# Patient Record
Sex: FEMALE | Race: WHITE | NOT HISPANIC OR LATINO | Employment: UNEMPLOYED | URBAN - METROPOLITAN AREA
[De-identification: names, ages, dates, MRNs, and addresses within clinical notes are randomized per-mention and may not be internally consistent; named-entity substitution may affect disease eponyms.]

---

## 2020-12-04 ENCOUNTER — APPOINTMENT (OUTPATIENT)
Dept: CT IMAGING | Facility: HOSPITAL | Age: 30
End: 2020-12-04

## 2020-12-04 ENCOUNTER — APPOINTMENT (OUTPATIENT)
Dept: GENERAL RADIOLOGY | Facility: HOSPITAL | Age: 30
End: 2020-12-04

## 2020-12-04 ENCOUNTER — HOSPITAL ENCOUNTER (OUTPATIENT)
Facility: HOSPITAL | Age: 30
Setting detail: OBSERVATION
Discharge: HOME | End: 2020-12-05
Attending: INTERNAL MEDICINE | Admitting: INTERNAL MEDICINE

## 2020-12-04 DIAGNOSIS — R07.9 CHEST PAIN, UNSPECIFIED TYPE: ICD-10-CM

## 2020-12-04 DIAGNOSIS — R50.9 FEVER, UNSPECIFIED FEVER CAUSE: ICD-10-CM

## 2020-12-04 DIAGNOSIS — U07.1 COVID-19 VIRUS INFECTION: ICD-10-CM

## 2020-12-04 DIAGNOSIS — R94.31 ABNORMAL EKG: ICD-10-CM

## 2020-12-04 DIAGNOSIS — U07.1 COVID-19 VIRUS DETECTED: Primary | ICD-10-CM

## 2020-12-04 PROBLEM — N90.89 VULVAR IRRITATION: Status: ACTIVE | Noted: 2018-08-13

## 2020-12-04 PROBLEM — N92.6 IRREGULAR MENSTRUAL CYCLE: Status: ACTIVE | Noted: 2018-08-13

## 2020-12-04 LAB
ABO GROUP BLD: NORMAL
ALBUMIN SERPL-MCNC: 4.5 G/DL (ref 3.5–5.2)
ALBUMIN/GLOB SERPL: 1.4 G/DL
ALP SERPL-CCNC: 79 U/L (ref 39–117)
ALT SERPL W P-5'-P-CCNC: 37 U/L (ref 1–33)
ANION GAP SERPL CALCULATED.3IONS-SCNC: 13 MMOL/L (ref 5–15)
AST SERPL-CCNC: 24 U/L (ref 1–32)
B-HCG UR QL: NEGATIVE
BASOPHILS # BLD AUTO: 0 10*3/MM3 (ref 0–0.2)
BASOPHILS NFR BLD AUTO: 0.7 % (ref 0–1.5)
BILIRUB SERPL-MCNC: 0.8 MG/DL (ref 0–1.2)
BLD GP AB SCN SERPL QL: NEGATIVE
BUN SERPL-MCNC: 11 MG/DL (ref 6–20)
BUN/CREAT SERPL: 14.7 (ref 7–25)
CALCIUM SPEC-SCNC: 9.5 MG/DL (ref 8.6–10.5)
CHLORIDE SERPL-SCNC: 99 MMOL/L (ref 98–107)
CO2 SERPL-SCNC: 22 MMOL/L (ref 22–29)
CREAT SERPL-MCNC: 0.75 MG/DL (ref 0.57–1)
CRP SERPL-MCNC: 0.77 MG/DL (ref 0–0.5)
D DIMER PPP FEU-MCNC: 0.32 MG/L (FEU) (ref 0–0.59)
D-LACTATE SERPL-SCNC: 1.4 MMOL/L (ref 0.5–2)
DEPRECATED RDW RBC AUTO: 37.2 FL (ref 37–54)
EOSINOPHIL # BLD AUTO: 0 10*3/MM3 (ref 0–0.4)
EOSINOPHIL NFR BLD AUTO: 0.5 % (ref 0.3–6.2)
ERYTHROCYTE [DISTWIDTH] IN BLOOD BY AUTOMATED COUNT: 12.9 % (ref 12.3–15.4)
FERRITIN SERPL-MCNC: 81.28 NG/ML (ref 13–150)
GFR SERPL CREATININE-BSD FRML MDRD: 91 ML/MIN/1.73
GLOBULIN UR ELPH-MCNC: 3.3 GM/DL
GLUCOSE SERPL-MCNC: 103 MG/DL (ref 65–99)
HCT VFR BLD AUTO: 45.6 % (ref 34–46.6)
HGB BLD-MCNC: 15.5 G/DL (ref 12–15.9)
LDH SERPL-CCNC: 147 U/L (ref 135–214)
LYMPHOCYTES # BLD AUTO: 0.5 10*3/MM3 (ref 0.7–3.1)
LYMPHOCYTES NFR BLD AUTO: 8.2 % (ref 19.6–45.3)
MCH RBC QN AUTO: 27.7 PG (ref 26.6–33)
MCHC RBC AUTO-ENTMCNC: 34 G/DL (ref 31.5–35.7)
MCV RBC AUTO: 81.5 FL (ref 79–97)
MONOCYTES # BLD AUTO: 0.8 10*3/MM3 (ref 0.1–0.9)
MONOCYTES NFR BLD AUTO: 12.6 % (ref 5–12)
NEUTROPHILS NFR BLD AUTO: 5.1 10*3/MM3 (ref 1.7–7)
NEUTROPHILS NFR BLD AUTO: 78 % (ref 42.7–76)
NRBC BLD AUTO-RTO: 0.3 /100 WBC (ref 0–0.2)
NT-PROBNP SERPL-MCNC: 52.5 PG/ML (ref 0–450)
PLATELET # BLD AUTO: 337 10*3/MM3 (ref 140–450)
PMV BLD AUTO: 8.5 FL (ref 6–12)
POTASSIUM SERPL-SCNC: 3.5 MMOL/L (ref 3.5–5.2)
PROCALCITONIN SERPL-MCNC: 0.08 NG/ML (ref 0–0.25)
PROT SERPL-MCNC: 7.8 G/DL (ref 6–8.5)
RBC # BLD AUTO: 5.59 10*6/MM3 (ref 3.77–5.28)
RH BLD: POSITIVE
SODIUM SERPL-SCNC: 134 MMOL/L (ref 136–145)
T&S EXPIRATION DATE: NORMAL
TROPONIN T SERPL-MCNC: <0.01 NG/ML (ref 0–0.03)
TROPONIN T SERPL-MCNC: <0.01 NG/ML (ref 0–0.03)
WBC # BLD AUTO: 6.6 10*3/MM3 (ref 3.4–10.8)

## 2020-12-04 PROCEDURE — G0378 HOSPITAL OBSERVATION PER HR: HCPCS

## 2020-12-04 PROCEDURE — 0 IOPAMIDOL PER 1 ML: Performed by: PHYSICIAN ASSISTANT

## 2020-12-04 PROCEDURE — 25010000002 ONDANSETRON PER 1 MG: Performed by: PHYSICIAN ASSISTANT

## 2020-12-04 PROCEDURE — 86900 BLOOD TYPING SEROLOGIC ABO: CPT

## 2020-12-04 PROCEDURE — 83615 LACTATE (LD) (LDH) ENZYME: CPT | Performed by: PHYSICIAN ASSISTANT

## 2020-12-04 PROCEDURE — 85025 COMPLETE CBC W/AUTO DIFF WBC: CPT | Performed by: PHYSICIAN ASSISTANT

## 2020-12-04 PROCEDURE — 85379 FIBRIN DEGRADATION QUANT: CPT | Performed by: PHYSICIAN ASSISTANT

## 2020-12-04 PROCEDURE — 96374 THER/PROPH/DIAG INJ IV PUSH: CPT

## 2020-12-04 PROCEDURE — 71045 X-RAY EXAM CHEST 1 VIEW: CPT

## 2020-12-04 PROCEDURE — 84484 ASSAY OF TROPONIN QUANT: CPT | Performed by: PHYSICIAN ASSISTANT

## 2020-12-04 PROCEDURE — 86140 C-REACTIVE PROTEIN: CPT | Performed by: PHYSICIAN ASSISTANT

## 2020-12-04 PROCEDURE — 82728 ASSAY OF FERRITIN: CPT | Performed by: PHYSICIAN ASSISTANT

## 2020-12-04 PROCEDURE — 99284 EMERGENCY DEPT VISIT MOD MDM: CPT

## 2020-12-04 PROCEDURE — 86901 BLOOD TYPING SEROLOGIC RH(D): CPT

## 2020-12-04 PROCEDURE — 80053 COMPREHEN METABOLIC PANEL: CPT | Performed by: PHYSICIAN ASSISTANT

## 2020-12-04 PROCEDURE — 83605 ASSAY OF LACTIC ACID: CPT | Performed by: PHYSICIAN ASSISTANT

## 2020-12-04 PROCEDURE — 81025 URINE PREGNANCY TEST: CPT | Performed by: PHYSICIAN ASSISTANT

## 2020-12-04 PROCEDURE — 93005 ELECTROCARDIOGRAM TRACING: CPT | Performed by: PHYSICIAN ASSISTANT

## 2020-12-04 PROCEDURE — 84145 PROCALCITONIN (PCT): CPT | Performed by: PHYSICIAN ASSISTANT

## 2020-12-04 PROCEDURE — 87040 BLOOD CULTURE FOR BACTERIA: CPT | Performed by: PHYSICIAN ASSISTANT

## 2020-12-04 PROCEDURE — 86901 BLOOD TYPING SEROLOGIC RH(D): CPT | Performed by: PHYSICIAN ASSISTANT

## 2020-12-04 PROCEDURE — 86900 BLOOD TYPING SEROLOGIC ABO: CPT | Performed by: PHYSICIAN ASSISTANT

## 2020-12-04 PROCEDURE — 71275 CT ANGIOGRAPHY CHEST: CPT

## 2020-12-04 PROCEDURE — 83880 ASSAY OF NATRIURETIC PEPTIDE: CPT | Performed by: PHYSICIAN ASSISTANT

## 2020-12-04 PROCEDURE — 99219 PR INITIAL OBSERVATION CARE/DAY 50 MINUTES: CPT | Performed by: PHYSICIAN ASSISTANT

## 2020-12-04 PROCEDURE — 86850 RBC ANTIBODY SCREEN: CPT | Performed by: PHYSICIAN ASSISTANT

## 2020-12-04 RX ORDER — ACETAMINOPHEN 650 MG/1
650 SUPPOSITORY RECTAL EVERY 4 HOURS PRN
Status: DISCONTINUED | OUTPATIENT
Start: 2020-12-04 | End: 2020-12-05 | Stop reason: HOSPADM

## 2020-12-04 RX ORDER — SODIUM CHLORIDE 0.9 % (FLUSH) 0.9 %
10 SYRINGE (ML) INJECTION AS NEEDED
Status: DISCONTINUED | OUTPATIENT
Start: 2020-12-04 | End: 2020-12-05 | Stop reason: HOSPADM

## 2020-12-04 RX ORDER — ACETAMINOPHEN 160 MG/5ML
650 SOLUTION ORAL EVERY 4 HOURS PRN
Status: DISCONTINUED | OUTPATIENT
Start: 2020-12-04 | End: 2020-12-05 | Stop reason: HOSPADM

## 2020-12-04 RX ORDER — POTASSIUM CHLORIDE 20 MEQ/1
40 TABLET, EXTENDED RELEASE ORAL AS NEEDED
Status: DISCONTINUED | OUTPATIENT
Start: 2020-12-04 | End: 2020-12-05 | Stop reason: HOSPADM

## 2020-12-04 RX ORDER — NITROGLYCERIN 0.4 MG/1
0.4 TABLET SUBLINGUAL
Status: DISCONTINUED | OUTPATIENT
Start: 2020-12-04 | End: 2020-12-05 | Stop reason: HOSPADM

## 2020-12-04 RX ORDER — ONDANSETRON 2 MG/ML
4 INJECTION INTRAMUSCULAR; INTRAVENOUS ONCE
Status: COMPLETED | OUTPATIENT
Start: 2020-12-04 | End: 2020-12-04

## 2020-12-04 RX ORDER — ACETAMINOPHEN 325 MG/1
650 TABLET ORAL EVERY 4 HOURS PRN
Status: DISCONTINUED | OUTPATIENT
Start: 2020-12-04 | End: 2020-12-05 | Stop reason: HOSPADM

## 2020-12-04 RX ORDER — SODIUM CHLORIDE 0.9 % (FLUSH) 0.9 %
10 SYRINGE (ML) INJECTION EVERY 12 HOURS SCHEDULED
Status: DISCONTINUED | OUTPATIENT
Start: 2020-12-04 | End: 2020-12-05 | Stop reason: HOSPADM

## 2020-12-04 RX ORDER — MAGNESIUM SULFATE HEPTAHYDRATE 40 MG/ML
2 INJECTION, SOLUTION INTRAVENOUS AS NEEDED
Status: DISCONTINUED | OUTPATIENT
Start: 2020-12-04 | End: 2020-12-05 | Stop reason: HOSPADM

## 2020-12-04 RX ORDER — ONDANSETRON 2 MG/ML
4 INJECTION INTRAMUSCULAR; INTRAVENOUS EVERY 6 HOURS PRN
Status: DISCONTINUED | OUTPATIENT
Start: 2020-12-04 | End: 2020-12-05 | Stop reason: HOSPADM

## 2020-12-04 RX ORDER — CHOLECALCIFEROL (VITAMIN D3) 125 MCG
5 CAPSULE ORAL NIGHTLY PRN
Status: DISCONTINUED | OUTPATIENT
Start: 2020-12-04 | End: 2020-12-05 | Stop reason: HOSPADM

## 2020-12-04 RX ORDER — ONDANSETRON 4 MG/1
4 TABLET, FILM COATED ORAL EVERY 6 HOURS PRN
Status: DISCONTINUED | OUTPATIENT
Start: 2020-12-04 | End: 2020-12-05 | Stop reason: HOSPADM

## 2020-12-04 RX ORDER — ACETAMINOPHEN 500 MG
1000 TABLET ORAL ONCE
Status: COMPLETED | OUTPATIENT
Start: 2020-12-04 | End: 2020-12-04

## 2020-12-04 RX ORDER — MAGNESIUM SULFATE 1 G/100ML
1 INJECTION INTRAVENOUS AS NEEDED
Status: DISCONTINUED | OUTPATIENT
Start: 2020-12-04 | End: 2020-12-05 | Stop reason: HOSPADM

## 2020-12-04 RX ORDER — ALUMINA, MAGNESIA, AND SIMETHICONE 2400; 2400; 240 MG/30ML; MG/30ML; MG/30ML
15 SUSPENSION ORAL EVERY 6 HOURS PRN
Status: DISCONTINUED | OUTPATIENT
Start: 2020-12-04 | End: 2020-12-05 | Stop reason: HOSPADM

## 2020-12-04 RX ADMIN — ACETAMINOPHEN 650 MG: 325 TABLET, FILM COATED ORAL at 23:15

## 2020-12-04 RX ADMIN — ONDANSETRON 4 MG: 2 INJECTION, SOLUTION INTRAMUSCULAR; INTRAVENOUS at 17:07

## 2020-12-04 RX ADMIN — SODIUM CHLORIDE 1000 ML: 9 INJECTION, SOLUTION INTRAVENOUS at 17:04

## 2020-12-04 RX ADMIN — Medication 10 ML: at 23:38

## 2020-12-04 RX ADMIN — IOPAMIDOL 100 ML: 755 INJECTION, SOLUTION INTRAVENOUS at 19:28

## 2020-12-04 RX ADMIN — ACETAMINOPHEN 1000 MG: 500 TABLET, FILM COATED ORAL at 17:08

## 2020-12-04 NOTE — ED PROVIDER NOTES
Subjective   Patient is a 30-year-old female with no significant PMH who presents with notable complaints.  Patient states she was exposed to someone with COVID-19 5 days ago and started having symptoms days ago.  Patient reports her symptoms include a cough without hemoptysis, low-grade fever, body aches and chills, sore throat, nausea, right-sided chest pain, shortness of breath with exertion.  Patient states she has been taking Tylenol with minimal relief of her symptoms.  Patient states she did get a rapid Covid test yesterday which was positive.  Patient describes her chest pain as a constant sharp type pain without any radiation the pain into her arm or jaw it is worse with palpation she currently rates it as severe.  She reports nausea yesterday evening denies any currently.  Patient denies any vomiting diarrhea or abdominal pain.  Chest denies any lower extremity edema or heart palpitations.  She denies any headache lightheadedness or dizziness.  Denies any other alleviating or just pain factors.  Denies any trouble handling her oral secretions or voice change.          Review of Systems   Constitutional: Positive for chills, fatigue and fever. Negative for activity change, appetite change, diaphoresis and unexpected weight change.   HENT: Positive for rhinorrhea and sore throat. Negative for congestion, ear discharge, ear pain, nosebleeds, sinus pressure, sinus pain, sneezing, trouble swallowing and voice change.    Eyes: Negative for photophobia and visual disturbance.   Respiratory: Positive for cough and shortness of breath. Negative for apnea, choking, chest tightness, wheezing and stridor.    Cardiovascular: Positive for chest pain. Negative for palpitations and leg swelling.   Gastrointestinal: Negative for abdominal distention, abdominal pain, constipation, diarrhea, nausea and vomiting.   Genitourinary: Negative.    Musculoskeletal: Positive for back pain. Negative for neck pain and neck stiffness.    Skin: Negative.    Neurological: Negative.        No past medical history on file.    No Known Allergies    No past surgical history on file.    No family history on file.    Social History     Socioeconomic History   • Marital status:      Spouse name: Not on file   • Number of children: Not on file   • Years of education: Not on file   • Highest education level: Not on file           Objective   Physical Exam  Vitals signs and nursing note reviewed.   Constitutional:       General: She is not in acute distress.     Appearance: She is well-developed. She is obese. She is not ill-appearing, toxic-appearing or diaphoretic.   HENT:      Head: Normocephalic and atraumatic.      Right Ear: Tympanic membrane, ear canal and external ear normal.      Left Ear: Tympanic membrane, ear canal and external ear normal.      Nose: Nose normal.      Mouth/Throat:      Mouth: Mucous membranes are moist.      Pharynx: Oropharynx is clear. Uvula midline. No pharyngeal swelling, oropharyngeal exudate, posterior oropharyngeal erythema or uvula swelling.      Tonsils: No tonsillar exudate or tonsillar abscesses.   Eyes:      General: No scleral icterus.     Extraocular Movements: Extraocular movements intact.      Pupils: Pupils are equal, round, and reactive to light.   Cardiovascular:      Rate and Rhythm: Normal rate and regular rhythm.      Pulses: Normal pulses.      Heart sounds: Normal heart sounds. No murmur. No friction rub. No gallop.    Pulmonary:      Effort: Pulmonary effort is normal. No respiratory distress.      Breath sounds: Normal breath sounds. No stridor. No wheezing, rhonchi or rales.   Chest:      Chest wall: No tenderness.   Abdominal:      General: Bowel sounds are normal. There is no distension. There are no signs of injury.      Palpations: Abdomen is soft. There is no fluid wave, hepatomegaly, splenomegaly or mass.      Tenderness: There is no abdominal tenderness. There is no right CVA tenderness,  "left CVA tenderness, guarding or rebound.      Hernia: No hernia is present.   Musculoskeletal:      Right lower leg: No edema.      Left lower leg: No edema.   Skin:     General: Skin is warm.      Capillary Refill: Capillary refill takes less than 2 seconds.      Coloration: Skin is not cyanotic, jaundiced or pale.      Findings: No rash.   Neurological:      General: No focal deficit present.      Mental Status: She is alert and oriented to person, place, and time.      GCS: GCS eye subscore is 4. GCS verbal subscore is 5. GCS motor subscore is 6.   Psychiatric:         Mood and Affect: Mood normal.         Behavior: Behavior normal.         Procedures           ED Course  ED Course as of Dec 04 2033   Fri Dec 04, 2020   1755 Patient care transferred to Ban [facility]COSO pending lab results and nadeem    [AA]   1846 Delay in care related to patient being taken to CT.    [AL]   1925 Patient in CT at this time.  Awaiting results.    [AL]      ED Course User Index  [AA] Luana Page PA  [AL] Ban Martin, NP    /82 (BP Location: Right arm, Patient Position: Lying)   Pulse 100   Temp 99.8 °F (37.7 °C) (Oral)   Resp 16   Ht 170.2 cm (67\")   Wt 121 kg (265 lb 14 oz)   LMP  (LMP Unknown)   SpO2 99%   BMI 41.64 kg/m²   Medications   sodium chloride 0.9 % flush 10 mL (has no administration in time range)   sodium chloride 0.9 % bolus 1,000 mL (0 mL Intravenous Stopped 12/4/20 1734)   acetaminophen (TYLENOL) tablet 1,000 mg (1,000 mg Oral Given 12/4/20 1708)   ondansetron (ZOFRAN) injection 4 mg (4 mg Intravenous Given 12/4/20 1707)   iopamidol (ISOVUE-370) 76 % injection 100 mL (100 mL Intravenous Given 12/4/20 1928)     Labs Reviewed   COMPREHENSIVE METABOLIC PANEL - Abnormal; Notable for the following components:       Result Value    Glucose 103 (*)     Sodium 134 (*)     ALT (SGPT) 37 (*)     All other components within normal limits    Narrative:     GFR Normal >60  Chronic Kidney Disease <60  Kidney " Failure <15     CBC WITH AUTO DIFFERENTIAL - Abnormal; Notable for the following components:    RBC 5.59 (*)     Neutrophil % 78.0 (*)     Lymphocyte % 8.2 (*)     Monocyte % 12.6 (*)     Lymphocytes, Absolute 0.50 (*)     nRBC 0.3 (*)     All other components within normal limits   PREGNANCY, URINE - Normal   BNP (IN-HOUSE) - Normal    Narrative:     Among patients with dyspnea, NT-proBNP is highly sensitive for the detection of acute congestive heart failure. In addition NT-proBNP of <300 pg/ml effectively rules out acute congestive heart failure with 99% negative predictive value.    Results may be falsely decreased if patient taking Biotin.     TROPONIN (IN-HOUSE) - Normal    Narrative:     Troponin T Reference Range:  <= 0.03 ng/mL-   Negative for AMI  >0.03 ng/mL-     Abnormal for myocardial necrosis.  Clinicians would have to utilize clinical acumen, EKG, Troponin and serial changes to determine if it is an Acute Myocardial Infarction or myocardial injury due to an underlying chronic condition.       Results may be falsely decreased if patient taking Biotin.     BLOOD CULTURE   BLOOD CULTURE   CBC AND DIFFERENTIAL    Narrative:     The following orders were created for panel order CBC & Differential.  Procedure                               Abnormality         Status                     ---------                               -----------         ------                     CBC Auto Differential[[id number]]        Abnormal            Final result                 Please view results for these tests on the individual orders.     Ct Chest Pulmonary Embolism    Result Date: 12/4/2020  No evidence of pulmonary embolus. Mild bronchial wall thickening, acute versus chronic bronchitis.     Electronically Signed By-Ingrid Smith MD On:12/4/2020 7:44 PM This report was finalized on [id number] by  Ingrid Smith MD.    Xr Chest Ap    Result Date: 12/4/2020  No acute chest findings.  Electronically Signed By-Alanna Marlow  MD On:12/4/2020 4:36 PM This report was finalized on [id number] by  Alanna Marlow MD.                                         HEART Score (for prediction of 6-week risk of major adverse cardiac event) reviewed and/or performed as part of the patient evaluation and treatment planning process.  The result associated with this review/performance is: 1       MDM  Number of Diagnoses or Management Options  Abnormal EKG:   Chest pain, unspecified type:   COVID-19 virus detected:   Fever, unspecified fever cause:   Diagnosis management comments: Chart Review:  Comorbidity: None  Differentials: Covid pneumonia, bronchitis, PE, URI, viral illness, electrolyte abnormality      ;this list is not all inclusive and does not constitute the entirety of considered causes  ECG: Interpreted on myself and Dr. Chaudhry shows sinus tachycardia abnormal T waves considered ischemia in diffuse leads  Labs: As above  Imaging: Was interpreted by physician and reviewed by myself: Ct Chest Pulmonary Embolism    Result Date: 12/4/2020  No evidence of pulmonary embolus. Mild bronchial wall thickening, acute versus chronic bronchitis.     Electronically Signed By-Ingrid Smith MD On:12/4/2020 7:44 PM This report was finalized on [id number] by  Ingrid Smith MD.    Xr Chest Ap    Result Date: 12/4/2020  No acute chest findings.  Electronically Signed By-Alanna Marlow MD On:12/4/2020 4:36 PM This report was finalized on [id number] by  Alanna Marlow MD.    Disposition/Treatment:  Insert PPE while in the ED IV was placed and labs were obtained patient was given fluids and Tylenol she was febrile upon arrival to the ED. patient was placed on proper monitor.  Patient care was transferred to [facility] pending results and disposition.    1754-care of this patient was assumed from LIZZETTE Lopez pending CT results and likely admission for EKG changes.  Previous HPI, ROS, PE, and MDM performed per previous provider.  Upon reassessment, patient was  noted to be pink warm and dry no distress noted.  She is ambulatory to the bedside commode without difficulties.  Vital signs stable.  Hospitalist paged for admission.  Spoke with LIZZETTE Ignacio who accepted admission on behalf of Dr. Zuniga.        Amount and/or Complexity of Data Reviewed  Clinical lab tests: reviewed  Tests in the radiology section of CPT®: reviewed  Tests in the medicine section of CPT®: reviewed    Patient Progress  Patient progress: stable      Final diagnoses:   COVID-19 virus detected   Fever, unspecified fever cause   Chest pain, unspecified type   Abnormal EKG            Ban Martin, NP  12/04/20 2033

## 2020-12-04 NOTE — ED NOTES
covid + prior to encounter c/o fever, and generalized body aches. Also c/o nausea and diarrhea this am     Kylie Mancera RN  12/04/20 2074

## 2020-12-05 VITALS
HEIGHT: 67 IN | DIASTOLIC BLOOD PRESSURE: 80 MMHG | RESPIRATION RATE: 16 BRPM | WEIGHT: 265.65 LBS | SYSTOLIC BLOOD PRESSURE: 126 MMHG | HEART RATE: 80 BPM | TEMPERATURE: 97.8 F | BODY MASS INDEX: 41.7 KG/M2 | OXYGEN SATURATION: 98 %

## 2020-12-05 PROBLEM — E66.01 MORBID OBESITY (HCC): Chronic | Status: ACTIVE | Noted: 2020-12-05

## 2020-12-05 PROBLEM — U07.1 COVID-19 VIRUS INFECTION: Status: ACTIVE | Noted: 2020-12-05

## 2020-12-05 LAB
ANION GAP SERPL CALCULATED.3IONS-SCNC: 8 MMOL/L (ref 5–15)
BASOPHILS # BLD AUTO: 0 10*3/MM3 (ref 0–0.2)
BASOPHILS NFR BLD AUTO: 1.2 % (ref 0–1.5)
BUN SERPL-MCNC: 12 MG/DL (ref 6–20)
BUN/CREAT SERPL: 15 (ref 7–25)
CALCIUM SPEC-SCNC: 8.6 MG/DL (ref 8.6–10.5)
CHLORIDE SERPL-SCNC: 106 MMOL/L (ref 98–107)
CO2 SERPL-SCNC: 26 MMOL/L (ref 22–29)
CREAT SERPL-MCNC: 0.8 MG/DL (ref 0.57–1)
CRP SERPL-MCNC: 0.72 MG/DL (ref 0–0.5)
D DIMER PPP FEU-MCNC: 0.27 MG/L (FEU) (ref 0–0.59)
DEPRECATED RDW RBC AUTO: 36.8 FL (ref 37–54)
EOSINOPHIL # BLD AUTO: 0 10*3/MM3 (ref 0–0.4)
EOSINOPHIL NFR BLD AUTO: 0.7 % (ref 0.3–6.2)
ERYTHROCYTE [DISTWIDTH] IN BLOOD BY AUTOMATED COUNT: 12.6 % (ref 12.3–15.4)
FERRITIN SERPL-MCNC: 116.5 NG/ML (ref 13–150)
GFR SERPL CREATININE-BSD FRML MDRD: 85 ML/MIN/1.73
GLUCOSE SERPL-MCNC: 91 MG/DL (ref 65–99)
HCT VFR BLD AUTO: 42.5 % (ref 34–46.6)
HGB BLD-MCNC: 13.8 G/DL (ref 12–15.9)
LDH SERPL-CCNC: 129 U/L (ref 135–214)
LYMPHOCYTES # BLD AUTO: 1.3 10*3/MM3 (ref 0.7–3.1)
LYMPHOCYTES NFR BLD AUTO: 30.4 % (ref 19.6–45.3)
MAGNESIUM SERPL-MCNC: 2 MG/DL (ref 1.6–2.6)
MCH RBC QN AUTO: 27.1 PG (ref 26.6–33)
MCHC RBC AUTO-ENTMCNC: 32.5 G/DL (ref 31.5–35.7)
MCV RBC AUTO: 83.5 FL (ref 79–97)
MONOCYTES # BLD AUTO: 1 10*3/MM3 (ref 0.1–0.9)
MONOCYTES NFR BLD AUTO: 23.4 % (ref 5–12)
NEUTROPHILS NFR BLD AUTO: 1.9 10*3/MM3 (ref 1.7–7)
NEUTROPHILS NFR BLD AUTO: 44.3 % (ref 42.7–76)
NRBC BLD AUTO-RTO: 0.1 /100 WBC (ref 0–0.2)
PLATELET # BLD AUTO: 292 10*3/MM3 (ref 140–450)
PMV BLD AUTO: 8.3 FL (ref 6–12)
POTASSIUM SERPL-SCNC: 3.9 MMOL/L (ref 3.5–5.2)
RBC # BLD AUTO: 5.08 10*6/MM3 (ref 3.77–5.28)
SODIUM SERPL-SCNC: 140 MMOL/L (ref 136–145)
WBC # BLD AUTO: 4.3 10*3/MM3 (ref 3.4–10.8)
WHOLE BLOOD HOLD SPECIMEN: NORMAL

## 2020-12-05 PROCEDURE — 83615 LACTATE (LD) (LDH) ENZYME: CPT | Performed by: PHYSICIAN ASSISTANT

## 2020-12-05 PROCEDURE — 99217 PR OBSERVATION CARE DISCHARGE MANAGEMENT: CPT | Performed by: INTERNAL MEDICINE

## 2020-12-05 PROCEDURE — 82728 ASSAY OF FERRITIN: CPT | Performed by: PHYSICIAN ASSISTANT

## 2020-12-05 PROCEDURE — 80048 BASIC METABOLIC PNL TOTAL CA: CPT | Performed by: PHYSICIAN ASSISTANT

## 2020-12-05 PROCEDURE — 86140 C-REACTIVE PROTEIN: CPT | Performed by: PHYSICIAN ASSISTANT

## 2020-12-05 PROCEDURE — 83735 ASSAY OF MAGNESIUM: CPT | Performed by: PHYSICIAN ASSISTANT

## 2020-12-05 PROCEDURE — G0378 HOSPITAL OBSERVATION PER HR: HCPCS

## 2020-12-05 PROCEDURE — 25010000002 ENOXAPARIN PER 10 MG: Performed by: PHYSICIAN ASSISTANT

## 2020-12-05 PROCEDURE — 96372 THER/PROPH/DIAG INJ SC/IM: CPT

## 2020-12-05 PROCEDURE — 85025 COMPLETE CBC W/AUTO DIFF WBC: CPT | Performed by: PHYSICIAN ASSISTANT

## 2020-12-05 PROCEDURE — 85379 FIBRIN DEGRADATION QUANT: CPT | Performed by: PHYSICIAN ASSISTANT

## 2020-12-05 RX ORDER — AZITHROMYCIN 250 MG/1
TABLET, FILM COATED ORAL
Qty: 6 TABLET | Refills: 0 | Status: SHIPPED | OUTPATIENT
Start: 2020-12-05

## 2020-12-05 RX ORDER — GUAIFENESIN 600 MG/1
1200 TABLET, EXTENDED RELEASE ORAL EVERY 12 HOURS SCHEDULED
Status: DISCONTINUED | OUTPATIENT
Start: 2020-12-05 | End: 2020-12-05 | Stop reason: HOSPADM

## 2020-12-05 RX ORDER — BENZONATATE 100 MG/1
200 CAPSULE ORAL 3 TIMES DAILY PRN
Status: DISCONTINUED | OUTPATIENT
Start: 2020-12-05 | End: 2020-12-05 | Stop reason: HOSPADM

## 2020-12-05 RX ORDER — FLUCONAZOLE 150 MG/1
150 TABLET ORAL ONCE
Qty: 1 TABLET | Refills: 0 | Status: SHIPPED | OUTPATIENT
Start: 2020-12-05 | End: 2020-12-05

## 2020-12-05 RX ORDER — DEXAMETHASONE 6 MG/1
6 TABLET ORAL
Qty: 10 TABLET | Refills: 0 | Status: SHIPPED | OUTPATIENT
Start: 2020-12-05 | End: 2020-12-15

## 2020-12-05 RX ADMIN — ENOXAPARIN SODIUM 40 MG: 40 INJECTION SUBCUTANEOUS at 07:56

## 2020-12-05 RX ADMIN — ACETAMINOPHEN 650 MG: 325 TABLET, FILM COATED ORAL at 12:41

## 2020-12-05 RX ADMIN — POTASSIUM CHLORIDE 20 MEQ: 1500 TABLET, EXTENDED RELEASE ORAL at 01:08

## 2020-12-05 RX ADMIN — ACETAMINOPHEN 650 MG: 325 TABLET, FILM COATED ORAL at 07:56

## 2020-12-05 RX ADMIN — GUAIFENESIN 1200 MG: 600 TABLET, EXTENDED RELEASE ORAL at 07:56

## 2020-12-05 RX ADMIN — ACETAMINOPHEN 650 MG: 325 TABLET, FILM COATED ORAL at 03:26

## 2020-12-05 RX ADMIN — Medication 10 ML: at 07:55

## 2020-12-05 RX ADMIN — ENOXAPARIN SODIUM 40 MG: 40 INJECTION SUBCUTANEOUS at 01:08

## 2020-12-05 NOTE — DISCHARGE SUMMARY
Date of Admission: 12/4/2020    Date of Discharge:  12/5/2020    Length of stay:  LOS: 0 days     Admission Diagnosis:   Chest pain [R07.9]  Abnormal EKG [R94.31]  Fever, unspecified fever cause [R50.9]  Chest pain, unspecified type [R07.9]  COVID-19 virus detected [U07.1]      Discharge Diagnosis:     Chest pain likely pleuritic in nature secondary to COVID-19  - serial troponin negative   -CT PE protocol shows no evidence of PE.  Mild bronchial wall thickening, acute versus chronic bronchitis.    - EKG with some nonspecific t wave changes  - steroids and azithromycin   - ibuprofen as needed  - will be given pulse oximeter at discharge  - encouraged to return to the ED if symptoms worsening     COVID-19 infection  - Tested positive on 12/3/2020  - Not currently requiring supplemental oxygen  -      Morbid obesity, BMI 41.75  - Encourage lifestyle modification         This patient has been examined wearing appropriate Personal Protective Equipment  12/05/20         Active Hospital Problems    Diagnosis  POA   • **Chest pain [R07.9]  Yes   • COVID-19 virus infection [U07.1]  Yes   • Morbid obesity (CMS/HCC) [E66.01]  Yes      Resolved Hospital Problems   No resolved problems to display.       Hospital Course:  Patient is a 30 y.o. female presented to the ED complaining of chest pains with inspiration. Patient was recently diagnosed with COVID 19. She reports her symptoms have improved since admission. Her serial troponin was negative. She will be discharged with pulse oximeter, steroids, azithromycin. Patient also requested diflucan which has also been sent in.   Patient will be discharged home in stable condition.         Procedures Performed:  none         Consults:   Consults     No orders found for last 30 day(s).          Vital Signs:  Temp:  [97.1 °F (36.2 °C)-100.7 °F (38.2 °C)] 97.8 °F (36.6 °C)  Heart Rate:  [] 80  Resp:  [16-18] 16  BP: ()/(62-92) 126/80        Physical Exam:  Physical  Exam  Vitals signs reviewed.   Constitutional:       Appearance: She is morbidly obese.   HENT:      Head: Normocephalic and atraumatic.      Mouth/Throat:      Mouth: Mucous membranes are moist.   Cardiovascular:      Rate and Rhythm: Normal rate and regular rhythm.      Heart sounds: No murmur.   Pulmonary:      Effort: Pulmonary effort is normal. No respiratory distress.      Breath sounds: No wheezing or rales.   Abdominal:      General: Bowel sounds are normal.      Palpations: Abdomen is soft.      Tenderness: There is no abdominal tenderness. There is no guarding.   Neurological:      Mental Status: She is alert and oriented to person, place, and time.   Psychiatric:         Mood and Affect: Mood normal.         Behavior: Behavior normal. Behavior is cooperative.           Pertinent Test Results:   Lab Results (last 72 hours)     Procedure Component Value Units Date/Time    Basic Metabolic Panel [[id number]]  (Normal) Collected: 12/05/20 0700    Specimen: Blood Updated: 12/05/20 0750     Glucose 91 mg/dL      BUN 12 mg/dL      Creatinine 0.80 mg/dL      Sodium 140 mmol/L      Potassium 3.9 mmol/L      Chloride 106 mmol/L      CO2 26.0 mmol/L      Calcium 8.6 mg/dL      eGFR Non African Amer 85 mL/min/1.73      BUN/Creatinine Ratio 15.0     Anion Gap 8.0 mmol/L     Narrative:      GFR Normal >60  Chronic Kidney Disease <60  Kidney Failure <15      Magnesium [[id number]]  (Normal) Collected: 12/05/20 0700    Specimen: Blood Updated: 12/05/20 0750     Magnesium 2.0 mg/dL     Lactate Dehydrogenase [[id number]]  (Abnormal) Collected: 12/05/20 0700    Specimen: Blood Updated: 12/05/20 0750      U/L     C-reactive Protein [[id number]]  (Abnormal) Collected: 12/05/20 0700    Specimen: Blood Updated: 12/05/20 0750     C-Reactive Protein 0.72 mg/dL     Ferritin [[id number]]  (Normal) Collected: 12/05/20 0700    Specimen: Blood Updated: 12/05/20 0749     Ferritin 116.50 ng/mL     Narrative:      Results may be falsely  decreased if patient taking Biotin.      CBC Auto Differential [[id number]]  (Abnormal) Collected: 12/05/20 0700    Specimen: Blood Updated: 12/05/20 0748     WBC 4.30 10*3/mm3      RBC 5.08 10*6/mm3      Hemoglobin 13.8 g/dL      Hematocrit 42.5 %      MCV 83.5 fL      MCH 27.1 pg      MCHC 32.5 g/dL      RDW 12.6 %      RDW-SD 36.8 fl      MPV 8.3 fL      Platelets 292 10*3/mm3      Neutrophil % 44.3 %      Lymphocyte % 30.4 %      Monocyte % 23.4 %      Eosinophil % 0.7 %      Basophil % 1.2 %      Neutrophils, Absolute 1.90 10*3/mm3      Lymphocytes, Absolute 1.30 10*3/mm3      Monocytes, Absolute 1.00 10*3/mm3      Eosinophils, Absolute 0.00 10*3/mm3      Basophils, Absolute 0.00 10*3/mm3      nRBC 0.1 /100 WBC     D-dimer, Quantitative [[id number]]  (Normal) Collected: 12/05/20 0700    Specimen: Blood Updated: 12/05/20 0734     D-Dimer, Quantitative 0.27 mg/L (FEU)     Narrative:      Reference Range  --------------------------------------------------------------------     < 0.50   Negative Predictive Value  0.50-0.59   Indeterminate    >= 0.60   Probable VTE             A very low percentage of patients with DVT may yield D-Dimer results   below the cut-off of 0.50 mg/L FEU.  This is known to be more   prevalent in patients with distal DVT.             Results of this test should always be interpreted in conjunction with   the patient's medical history, clinical presentation and other   findings.  Clinical diagnosis should not be based on the result of   INNOVANCE D-Dimer alone.    Extra Tubes [[id number]] Collected: 12/04/20 2249    Specimen: Blood, Venous Line Updated: 12/05/20 0000    Narrative:      The following orders were created for panel order Extra Tubes.  Procedure                               Abnormality         Status                     ---------                               -----------         ------                     Lavender Top[[id number]]                                     Final result      "            Please view results for these tests on the individual orders.    Vanessa Top [[id number]] Collected: 12/04/20 2249    Specimen: Blood Updated: 12/05/20 0000     Extra Tube hold for add-on     Comment: Auto resulted       Procalcitonin [[id number]]  (Normal) Collected: 12/04/20 2249    Specimen: Blood Updated: 12/04/20 2339     Procalcitonin 0.08 ng/mL     Narrative:      As a Marker for Sepsis (Non-Neonates):   1. <0.5 ng/mL represents a low risk of severe sepsis and/or septic shock.  1. >2 ng/mL represents a high risk of severe sepsis and/or septic shock.    As a Marker for Lower Respiratory Tract Infections that require antibiotic therapy:  PCT on Admission     Antibiotic Therapy             6-12 Hrs later  > 0.5                Strongly Recommended            >0.25 - <0.5         Recommended  0.1 - 0.25           Discouraged                   Remeasure/reassess PCT  <0.1                 Strongly Discouraged          Remeasure/reassess PCT      As 28 day mortality risk marker: \"Change in Procalcitonin Result\" (> 80 % or <=80 %) if Day 0 (or Day 1) and Day 4 values are available. Refer to http://[email or web address]pct[facility][email or web address]/   Change in PCT <=80 %   A decrease of PCT levels below or equal to 80 % defines a positive change in PCT test result representing a higher risk for 28-day all-cause mortality of patients diagnosed with severe sepsis or septic shock.  Change in PCT > 80 %   A decrease of PCT levels of more than 80 % defines a negative change in PCT result representing a lower risk for 28-day all-cause mortality of patients diagnosed with severe sepsis or septic shock.                Results may be falsely decreased if patient taking Biotin.     Troponin [[id number]]  (Normal) Collected: 12/04/20 2249    Specimen: Blood Updated: 12/04/20 2338     Troponin T <0.010 ng/mL     Narrative:      Troponin T Reference Range:  <= 0.03 ng/mL-   Negative for AMI  >0.03 ng/mL-     Abnormal for myocardial necrosis. "  Clinicians would have to utilize clinical acumen, EKG, Troponin and serial changes to determine if it is an Acute Myocardial Infarction or myocardial injury due to an underlying chronic condition.       Results may be falsely decreased if patient taking Biotin.      Ferritin [[id number]]  (Normal) Collected: 12/04/20 2249    Specimen: Blood Updated: 12/04/20 2338     Ferritin 81.28 ng/mL     Narrative:      Results may be falsely decreased if patient taking Biotin.      Lactic Acid, Plasma [[id number]]  (Normal) Collected: 12/04/20 2249    Specimen: Blood Updated: 12/04/20 2335     Lactate 1.4 mmol/L     Lactate Dehydrogenase [[id number]]  (Normal) Collected: 12/04/20 2249    Specimen: Blood Updated: 12/04/20 2333      U/L     C-reactive Protein [[id number]]  (Abnormal) Collected: 12/04/20 2249    Specimen: Blood Updated: 12/04/20 2333     C-Reactive Protein 0.77 mg/dL     D-dimer, Quantitative [[id number]]  (Normal) Collected: 12/04/20 2249    Specimen: Blood Updated: 12/04/20 2327     D-Dimer, Quantitative 0.32 mg/L (FEU)     Narrative:      Reference Range  --------------------------------------------------------------------     < 0.50   Negative Predictive Value  0.50-0.59   Indeterminate    >= 0.60   Probable VTE             A very low percentage of patients with DVT may yield D-Dimer results   below the cut-off of 0.50 mg/L FEU.  This is known to be more   prevalent in patients with distal DVT.             Results of this test should always be interpreted in conjunction with   the patient's medical history, clinical presentation and other   findings.  Clinical diagnosis should not be based on the result of   [facility]NOVANCE D-Dimer alone.    Pregnancy, Urine - Urine, Clean Catch [[id number]]  (Normal) Collected: 12/04/20 1747    Specimen: Urine, Clean Catch Updated: 12/04/20 1807     HCG, Urine QL Negative    BNP [[id number]]  (Normal) Collected: 12/04/20 1709    Specimen: Blood Updated: 12/04/20 0649     proBNP  52.5 pg/mL     Narrative:      Among patients with dyspnea, NT-proBNP is highly sensitive for the detection of acute congestive heart failure. In addition NT-proBNP of <300 pg/ml effectively rules out acute congestive heart failure with 99% negative predictive value.    Results may be falsely decreased if patient taking Biotin.      Comprehensive Metabolic Panel [[id number]]  (Abnormal) Collected: 12/04/20 1709    Specimen: Blood Updated: 12/04/20 1744     Glucose 103 mg/dL      BUN 11 mg/dL      Creatinine 0.75 mg/dL      Sodium 134 mmol/L      Potassium 3.5 mmol/L      Chloride 99 mmol/L      CO2 22.0 mmol/L      Calcium 9.5 mg/dL      Total Protein 7.8 g/dL      Albumin 4.50 g/dL      ALT (SGPT) 37 U/L      AST (SGOT) 24 U/L      Alkaline Phosphatase 79 U/L      Total Bilirubin 0.8 mg/dL      eGFR Non African Amer 91 mL/min/1.73      Globulin 3.3 gm/dL      A/G Ratio 1.4 g/dL      BUN/Creatinine Ratio 14.7     Anion Gap 13.0 mmol/L     Narrative:      GFR Normal >60  Chronic Kidney Disease <60  Kidney Failure <15      Troponin [[id number]]  (Normal) Collected: 12/04/20 1709    Specimen: Blood Updated: 12/04/20 1744     Troponin T <0.010 ng/mL     Narrative:      Troponin T Reference Range:  <= 0.03 ng/mL-   Negative for AMI  >0.03 ng/mL-     Abnormal for myocardial necrosis.  Clinicians would have to utilize clinical acumen, EKG, Troponin and serial changes to determine if it is an Acute Myocardial Infarction or myocardial injury due to an underlying chronic condition.       Results may be falsely decreased if patient taking Biotin.      Blood Culture - Blood, Arm, Left [[id number]] Collected: 12/04/20 1709    Specimen: Blood from Arm, Left Updated: 12/04/20 1741    CBC & Differential [[id number]]  (Abnormal) Collected: 12/04/20 1709    Specimen: Blood Updated: 12/04/20 1722    Narrative:      The following orders were created for panel order CBC & Differential.  Procedure                               Abnormality          Status                     ---------                               -----------         ------                     CBC Auto Differential[[id number]]        Abnormal            Final result                 Please view results for these tests on the individual orders.    CBC Auto Differential [[id number]]  (Abnormal) Collected: 12/04/20 1709    Specimen: Blood Updated: 12/04/20 1722     WBC 6.60 10*3/mm3      RBC 5.59 10*6/mm3      Hemoglobin 15.5 g/dL      Hematocrit 45.6 %      MCV 81.5 fL      MCH 27.7 pg      MCHC 34.0 g/dL      RDW 12.9 %      RDW-SD 37.2 fl      MPV 8.5 fL      Platelets 337 10*3/mm3      Neutrophil % 78.0 %      Lymphocyte % 8.2 %      Monocyte % 12.6 %      Eosinophil % 0.5 %      Basophil % 0.7 %      Neutrophils, Absolute 5.10 10*3/mm3      Lymphocytes, Absolute 0.50 10*3/mm3      Monocytes, Absolute 0.80 10*3/mm3      Eosinophils, Absolute 0.00 10*3/mm3      Basophils, Absolute 0.00 10*3/mm3      nRBC 0.3 /100 WBC     Blood Culture - Blood, Arm, Right [[id number]] Collected: 12/04/20 1709    Specimen: Blood from Arm, Right Updated: 12/04/20 1716            Imaging Results (All)     Procedure Component Value Units Date/Time    CT Chest Pulmonary Embolism [[id number]] Collected: 12/04/20 1943     Updated: 12/04/20 1946    Narrative:      CT CHEST PULMONARY EMBOLISM-     Date of Exam: 12/4/2020 7:21 PM     Indication: shortness of breath and tachycardia; U07.1-COVID-19;  R50.9-Fever, unspecified; R07.9-Chest pain, unspecified.     Comparison: Chest radiograph 12/04/2020     Technique: Serial and axial CT images of the chest were obtained  following the uneventful intravenous administration of 100 cc Isovue-370  contrast. Reconstructions in the coronal and sagittal planes were also  performed.  Automated exposure control and iterative reconstruction  methods were used.     FINDINGS:     Pulmonary Arteries: Excellent contrast opacification of the pulmonary  arteries.  No intraluminal filling  defects to suggest pulmonary embolus.   The pulmonary arteries are normal in caliber.        Hilum and Mediastinum: No pathologically enlarged lymph nodes.  Normal  heart size.  No significant coronary artery atherosclerotic disease.  Unremarkable thoracic aorta.   No pericardial effusion.     Lung Parenchyma and Pleura: No focal consolidations. Mild bronchial wall  thickening. Acute versus chronic bronchitis. No suspicious pulmonary  nodules.  No endobronchial lesions.  No significant pleural effusions.     Upper Abdomen: Status post cholecystectomy.     Soft tissues: Unremarkable.     Osseous structures: No aggressive focal lytic or sclerotic osseous  lesions.       Impression:      No evidence of pulmonary embolus. Mild bronchial wall thickening, acute  versus chronic bronchitis.              Electronically Signed By-Ingrid Smith MD On:12/4/2020 7:44 PM  This report was finalized on [id number] by  Ingrid Smith MD.    XR Chest AP [[id number]] Collected: 12/04/20 1635     Updated: 12/04/20 1644    Narrative:      DATE OF EXAM:  12/4/2020 4:25 PM     PROCEDURE:  XR CHEST AP-     INDICATIONS:  COVID Evaluation, Cough, Fever       COMPARISON:  None     TECHNIQUE:   Single radiographic view of the chest was obtained.     FINDINGS:  No acute airspace disease. Normal heart size. No pleural effusion,  pneumothorax or acute osseous abnormalities.       Impression:      No acute chest findings.     Electronically Signed By-Alanna Marlow MD On:12/4/2020 4:36 PM  This report was finalized on [id number] by  Alanna Marlow MD.                Discharge Disposition:  Home or Self Care    Discharge Medications:     Discharge Medications      New Medications      Instructions Start Date   azithromycin 250 MG tablet  Commonly known as: Zithromax Z-Juan   Take 2 tablets by mouth on day 1, then 1 tablet daily on days 2-5      dexamethasone 6 MG tablet  Commonly known as: DECADRON   6 mg, Oral, Daily With Breakfast         fluconazole 150 MG tablet  Commonly known as: Diflucan   150 mg, Oral, Once             Discharge Diet:   Diet Instructions     Diet: Regular      Discharge Diet: Regular          Activity at Discharge:   Activity Instructions     Activity as Tolerated            Follow-up Appointments  No future appointments.    Test Results Pending at Discharge:  Blood cultures    Condition on Discharge:    Stable      Risk for Readmission (LACE): Score: 1 (12/5/2020  6:00 AM)          Qian Zuniga DO  12/05/20  15:10 EST

## 2020-12-05 NOTE — PLAN OF CARE
Goal Outcome Evaluation:  Plan of Care Reviewed With: patient  Progress: improving  Outcome Summary: Chest pain is improved, c/o headache, vital signs stable, continue to monitor

## 2020-12-05 NOTE — H&P
[facility] Medicine Services      Patient Name: Jaclyn Trejo  : 1990  MRN: [id number]  Primary Care Physician: Michael Boykin MD  Date of admission: 2020    Patient Care Team:  Michael Boykin MD as PCP - General (Family Medicine)          Subjective   History Present Illness     Chief Complaint:   Chief Complaint   Patient presents with   • Generalized Body Aches         Ms. Trejo is a 30 y.o. female presents to [facility] ER on 2020 complaining of chest pain for the past 4 days.  Patient states that it has been constant and worsening.  Patient currently rates the pain about a 6 out of 10.  Patient states it is nonradiating and is a dull pressure in the center of her chest.  Patient states it is worse with cough.  Patient reports a dry cough but denies hemoptysis.  Patient reports body aches and chills some nausea.  Patient states that she has some shortness of breath upon exertion but denies any shortness of breath at rest.  Patient states her chest pain is mainly on the right side of her chest.  Patient states Tylenol did not help her symptoms much.  Patient otherwise denies vomiting, diarrhea, abdominal pain, urinary symptoms, lower extremity edema, palpitations, dizziness, lightheadedness, headache.    In the ED, initial troponin negative, proBNP normal, CMP largely unremarkable.  hCG negative, blood cultures pending x2, chest x-ray shows no acute chest findings.  CT PE protocol shows no evidence of PE.  Mild bronchial wall thickening, acute versus chronic bronchitis.  EKG shows sinus tachycardia 118 bpm, abnormal T, consider ischemia in diffuse leads, no ST elevation apparent.  Last Covid test was positive on 12/3/2020.  Patient with temperature 100.7, pulse in the 90s, on room air oxygen and 99% SPO2 blood pressure normotensive.    Review of Systems   Constitution: Positive for chills, fever and malaise/fatigue.   HENT: Negative.       Cardiovascular: Positive for chest pain and dyspnea on exertion. Negative for leg swelling, near-syncope, orthopnea and palpitations.   Respiratory: Positive for cough and shortness of breath. Negative for hemoptysis, sputum production and wheezing.    Skin: Negative.    Musculoskeletal: Negative.    Gastrointestinal: Positive for nausea. Negative for abdominal pain, diarrhea and vomiting.   Genitourinary: Negative.    Neurological: Negative.    Psychiatric/Behavioral: Negative.            Personal History     Past Medical History:   Past Medical History:   Diagnosis Date   • Morbid obesity (CMS/HCC) 12/5/2020       Surgical History:    History reviewed. No pertinent surgical history.        Family History: family history includes Heart disease in her maternal grandfather. Otherwise pertinent FHx was reviewed and unremarkable.     Social History:  reports that she has never smoked. She has never used smokeless tobacco. She reports previous alcohol use. She reports that she does not use drugs.      Medications:  Prior to Admission medications    Not on File       Allergies:  No Known Allergies    Objective   Objective     Vital Signs  Temp:  [97.1 °F (36.2 °C)-100.7 °F (38.2 °C)] 97.1 °F (36.2 °C)  Heart Rate:  [] 62  Resp:  [16-18] 16  BP: ()/(62-99) 97/62  SpO2:  [97 %-99 %] 99 %  on   ;   Device (Oxygen Therapy): room air  Body mass index is 41.75 kg/m².    Physical Exam  Vitals signs and nursing note reviewed.   Constitutional:       General: She is not in acute distress.     Appearance: She is well-developed. She is obese. She is not diaphoretic.   HENT:      Head: Normocephalic and atraumatic.      Nose: Nose normal.      Mouth/Throat:      Pharynx: No oropharyngeal exudate.   Eyes:      Extraocular Movements: Extraocular movements intact.      Conjunctiva/sclera: Conjunctivae normal.      Pupils: Pupils are equal, round, and reactive to light.   Neck:      Musculoskeletal: Normal range of motion.    Cardiovascular:      Rate and Rhythm: Normal rate and regular rhythm.      Heart sounds: Normal heart sounds.      Comments: S1, S2 audible.  Pulmonary:      Effort: Pulmonary effort is normal. No respiratory distress.      Breath sounds: Normal breath sounds. No wheezing, rhonchi or rales.      Comments: On room air.  Abdominal:      General: Bowel sounds are normal. There is no distension.      Palpations: Abdomen is soft.      Tenderness: There is no abdominal tenderness. There is no guarding or rebound.   Musculoskeletal: Normal range of motion.         General: No tenderness or deformity.   Skin:     General: Skin is warm.      Findings: No erythema or rash.   Neurological:      Mental Status: She is alert and oriented to person, place, and time.      Cranial Nerves: No cranial nerve deficit.   Psychiatric:         Mood and Affect: Mood normal.         Behavior: Behavior normal.         Results Review:  I have personally reviewed most recent cardiac tracings, lab results and radiology images and interpretations and agree with findings.    Results from last 7 days   Lab Units 12/04/20  1709   WBC 10*3/mm3 6.60   HEMOGLOBIN g/dL 15.5   HEMATOCRIT % 45.6   PLATELETS 10*3/mm3 337     Results from last 7 days   Lab Units 12/04/20  2249 12/04/20  1709   SODIUM mmol/L  --  134*   POTASSIUM mmol/L  --  3.5   CHLORIDE mmol/L  --  99   CO2 mmol/L  --  22.0   BUN mg/dL  --  11   CREATININE mg/dL  --  0.75   GLUCOSE mg/dL  --  103*   CALCIUM mg/dL  --  9.5   ALT (SGPT) U/L  --  37*   AST (SGOT) U/L  --  24   TROPONIN T ng/mL <0.010 <0.010   PROBNP pg/mL  --  52.5   LACTATE mmol/L 1.4  --    PROCALCITONIN ng/mL 0.08  --      Estimated Creatinine Clearance: 149.2 mL/min (by C-G formula based on SCr of 0.75 mg/dL).  Brief Urine Lab Results  (Last result in the past 365 days)      Color   Clarity   Blood   Leuk Est   Nitrite   Protein   CREAT   Urine HCG        12/04/20 1747               Negative           Microbiology  Results (last 10 days)     ** No results found for the last 240 hours. **          ECG/EMG Results (most recent)     Procedure Component Value Units Date/Time    ECG 12 Lead [[id number]] Collected: 12/04/20 1623     Updated: 12/04/20 1633     QT Interval 338 ms     Narrative:      HEART RATE= 118  bpm  RR Interval= 508  ms  TN Interval= 145  ms  P Horizontal Axis= 10  deg  P Front Axis= 52  deg  QRSD Interval= 92  ms  QT Interval= 338  ms  QRS Axis= 48  deg  T Wave Axis= -48  deg  - ABNORMAL ECG -  Sinus tachycardia  Abnormal T, consider ischemia, diffuse leads  Electronically Signed By:   Date and Time of Study: 2020-12-04 16:23:01                    Ct Chest Pulmonary Embolism    Result Date: 12/4/2020  No evidence of pulmonary embolus. Mild bronchial wall thickening, acute versus chronic bronchitis.     Electronically Signed By-Ingrid Smith MD On:12/4/2020 7:44 PM This report was finalized on [id number] by  Ingrid Smith MD.    Xr Chest Ap    Result Date: 12/4/2020  No acute chest findings.  Electronically Signed By-Alanna Marlow MD On:12/4/2020 4:36 PM This report was finalized on [id number] by  Alanna Marlow MD.        Estimated Creatinine Clearance: 149.2 mL/min (by C-G formula based on SCr of 0.75 mg/dL).    Assessment/Plan   Assessment/Plan       Active Hospital Problems    Diagnosis  POA   • **Chest pain [R07.9]  Yes   • COVID-19 virus infection [U07.1]  Yes   • Morbid obesity (CMS/HCC) [E66.01]  Yes      Resolved Hospital Problems   No resolved problems to display.       Chest pain  -Rule out ACS, likely pleuritic in nature secondary to COVID-19  -initial troponin negative, proBNP normal, CMP largely unremarkable.    -hCG negative,   -chest x-ray shows no acute chest findings.    -CT PE protocol shows no evidence of PE.  Mild bronchial wall thickening, acute versus chronic bronchitis.    -EKG shows sinus tachycardia 118 bpm, abnormal T, consider ischemia in diffuse leads, no ST elevation apparent.       -Last Covid test was positive on 12/3/2020.    -Patient with temperature 100.7, pulse in the 90s, on room air oxygen and 99% SPO2 blood pressure normotensive.  -Unable to perform stress test with patient being Covid positive  -Check serial troponins, echo  -Continuous cardiac monitoring  -Heart healthy diet    COVID-19 infection  -Tested positive on 12/3/2020  -Not currently requiring supplemental oxygen  -Monitor LDH, dimer, ferritin, pro-Ronald, CRP  -Tessalon Perlnikkie Mucinex ordered  -blood cultures pending x2,     Morbid obesity, BMI 41.75  -Encourage lifestyle modification      VTE Prophylaxis - SCDs  Mechanical Order History:     None      Pharmalogical Order History:     None          CODE STATUS:    Code Status and Medical Interventions:   Ordered at: 12/04/20 2226     Code Status:    CPR     Medical Interventions (Level of Support Prior to Arrest):    Full       This patient has been examined wearing appropriate Personal Protective Equipment and discussed with hospital infection control department. 12/05/20      I discussed the patient's findings and my recommendations with patient.      Signature:Electronically signed by LIZZETTE Hitchcock, 12/05/20, 6:12 AM EST.      [facility] Hospitalist Team

## 2020-12-05 NOTE — PLAN OF CARE
Goal Outcome Evaluation:  Plan of Care Reviewed With: patient  Progress: no change  Outcome Summary: only c/ois headache,managed by Tylenol, RA sta 100% currently,will discharge

## 2020-12-05 NOTE — NURSING NOTE
Pt given pulse ox for discharge monitoring.Pt educated on use, return to ER for increased SOA or sat <92%, given resources for care (PCP, hotline number). Pt instructed on home care including quarantine and symptom monitoring, and to call to make F/U with PCP Monday

## 2020-12-06 LAB — QT INTERVAL: 338 MS

## 2020-12-07 NOTE — PROGRESS NOTES
Case Management Discharge Note                Selected Continued Care - Discharged on 12/5/2020 Admission date: 12/4/2020 - Discharge disposition: Home or Self Care                 Final Discharge Disposition Code: 01 - home or self-care

## 2020-12-09 LAB
BACTERIA SPEC AEROBE CULT: NORMAL
BACTERIA SPEC AEROBE CULT: NORMAL